# Patient Record
Sex: FEMALE | Race: WHITE | URBAN - METROPOLITAN AREA
[De-identification: names, ages, dates, MRNs, and addresses within clinical notes are randomized per-mention and may not be internally consistent; named-entity substitution may affect disease eponyms.]

---

## 2017-06-18 ENCOUNTER — OFFICE VISIT (OUTPATIENT)
Dept: URGENT CARE | Facility: URGENT CARE | Age: 72
End: 2017-06-18
Payer: MEDICARE

## 2017-06-18 VITALS
DIASTOLIC BLOOD PRESSURE: 88 MMHG | WEIGHT: 148.8 LBS | HEART RATE: 102 BPM | BODY MASS INDEX: 25.15 KG/M2 | OXYGEN SATURATION: 98 % | SYSTOLIC BLOOD PRESSURE: 130 MMHG | TEMPERATURE: 98.2 F

## 2017-06-18 DIAGNOSIS — S61.431A PUNCTURE WOUND OF HAND, RIGHT, INITIAL ENCOUNTER: ICD-10-CM

## 2017-06-18 DIAGNOSIS — S61.411A HAND LACERATION, RIGHT, INITIAL ENCOUNTER: Primary | ICD-10-CM

## 2017-06-18 PROCEDURE — 12001 RPR S/N/AX/GEN/TRNK 2.5CM/<: CPT | Performed by: FAMILY MEDICINE

## 2017-06-18 PROCEDURE — 90714 TD VACC NO PRESV 7 YRS+ IM: CPT | Performed by: FAMILY MEDICINE

## 2017-06-18 PROCEDURE — 90471 IMMUNIZATION ADMIN: CPT | Performed by: FAMILY MEDICINE

## 2017-06-18 RX ORDER — AMOXICILLIN AND CLAVULANATE POTASSIUM 500; 125 MG/1; MG/1
1 TABLET, FILM COATED ORAL 3 TIMES DAILY
Qty: 30 TABLET | Refills: 0 | Status: SHIPPED | OUTPATIENT
Start: 2017-06-18

## 2017-06-18 NOTE — NURSING NOTE
"Chief Complaint   Patient presents with     Laceration     right thunmb cut, fatigue, dizzy 40xmin        Initial /88 (BP Location: Left arm, Patient Position: Chair, Cuff Size: Adult Regular)  Pulse 102  Temp 98.2  F (36.8  C) (Oral)  Wt 148 lb 12.8 oz (67.5 kg)  LMP 10/23/2002  SpO2 98%  BMI 25.15 kg/m2 Estimated body mass index is 25.15 kg/(m^2) as calculated from the following:    Height as of 2/24/09: 5' 4.5\" (1.638 m).    Weight as of this encounter: 148 lb 12.8 oz (67.5 kg).  Medication Reconciliation: complete    "

## 2017-06-18 NOTE — MR AVS SNAPSHOT
"              After Visit Summary   2017    Ana Paula Uriostegui    MRN: [id number]           Patient Information     Date Of Birth          1945        Visit Information        Provider Department      2017 4:30 PM Cindy Gomez MD [facility]        Today's Diagnoses     Hand laceration, right, initial encounter    -  1    Puncture wound of hand, right, initial encounter           Follow-ups after your visit        Who to contact     If you have questions or need follow up information about today's clinic visit or your schedule please contact [facility] directly at [phone].  Normal or non-critical lab and imaging results will be communicated to you by [employer], letter or phone within 4 business days after the clinic has received the results. If you do not hear from us within 7 days, please contact the clinic through [employer] or phone. If you have a critical or abnormal lab result, we will notify you by phone as soon as possible.  Submit refill requests through [employer] or call your pharmacy and they will forward the refill request to us. Please allow 3 business days for your refill to be completed.          Additional Information About Your Visit        MyChart Information     [employer] lets you send messages to your doctor, view your test results, renew your prescriptions, schedule appointments and more. To sign up, go to [email or web address] . Click on \"Log in\" on the left side of the screen, which will take you to the Welcome page. Then click on \"Sign up Now\" on the right side of the page.     You will be asked to enter the access code listed below, as well as some personal information. Please follow the directions to create your username and password.     Your access code is: [email or web address]  Expires: 2017  5:42 PM     Your access code will  in 90 days. If you need help or a new code, please call your [place] clinic or " [phone].        Care EveryWhere ID     This is your Care EveryWhere ID. This could be used by other organizations to access your [facility] medical records  [id number]        Your Vitals Were     Pulse Temperature Last Period Pulse Oximetry BMI (Body Mass Index)       102 98.2  F (36.8  C) (Oral) 10/23/2002 98% 25.15 kg/m2        Blood Pressure from Last 3 Encounters:   06/18/17 130/88   07/21/15 100/62   11/08/14 106/74    Weight from Last 3 Encounters:   06/18/17 148 lb 12.8 oz (67.5 kg)   07/21/15 135 lb 6.4 oz (61.4 kg)   11/08/14 150 lb (68 kg)              We Performed the Following     CLOSURE SUPERFICIAL, WOUND DEHIS SIMPLE     TD PRESERV FREE >=7 YRS ADS IM          Today's Medication Changes          These changes are accurate as of: 6/18/17  5:42 PM.  If you have any questions, ask your nurse or doctor.               Start taking these medicines.        Dose/Directions    amoxicillin-clavulanate 500-125 MG per tablet   Commonly known as:  AUGMENTIN   Used for:  Hand laceration, right, initial encounter   Started by:  Cindy Gomez MD        Dose:  1 tablet   Take 1 tablet by mouth 3 times daily   Quantity:  30 tablet   Refills:  0            Where to get your medicines      Some of these will need a paper prescription and others can be bought over the counter.  Ask your nurse if you have questions.     Bring a paper prescription for each of these medications     amoxicillin-clavulanate 500-125 MG per tablet                Primary Care Provider Office Phone # Fax #    Red Gottlieb [phone]       [facility] [address] [facility] [address]        Thank you!     Thank you for choosing [facility]  for your care. Our goal is always to provide you with excellent care. Hearing back from our patients is one way we can continue to improve our services. Please take a few minutes to complete the written survey that you may receive in the mail  after your visit with us. Thank you!             Your Updated Medication List - Protect others around you: Learn how to safely use, store and throw away your medicines at www.disposemymeds.org.          This list is accurate as of: 6/18/17  5:42 PM.  Always use your most recent med list.                   Brand Name Dispense Instructions for use    amoxicillin-clavulanate 500-125 MG per tablet    AUGMENTIN    30 tablet    Take 1 tablet by mouth 3 times daily       CALCIUM + D CAPS 150-261-330 MG-MG-IU OR      1 tablet qd       cholecalciferol 5000 UNITS Caps    DIALYVITE VITAMIN D 5000    30 capsule    One daily       doxylamine 25 MG Tabs tablet    UNISOM     1 TABLET AT BEDTIME AS NEEDED       EPINEPHrine 0.3 MG/0.3ML injection    EPIPEN    2 each    Inject 0.3 mLs into the muscle once as needed for anaphylaxis for 1 dose.       loratadine 10 MG tablet    CLARITIN     1 TABLET DAILY       MUCINEX 600 MG 12 hr tablet   Generic drug:  guaiFENesin     28    1 TABLET EVERY 12 HOURS AS NEEDED       Multi-vitamin Tabs tablet   Generic drug:  multivitamin, therapeutic with minerals      immune enhancer       VALIUM 10 MG tablet   Generic drug:  diazepam      Take 10 mg by mouth every 6 hours as needed.       ZYRTEC 10 MG tablet   Generic drug:  cetirizine     90 Tab    1 tab PO QD PRN allergies

## 2017-06-18 NOTE — PROGRESS NOTES
Chief Complaint   Patient presents with     Laceration     right thunmb cut, fatigue, dizzy 40xmin      SUBJECTIVE:     Chief Complaint   Patient presents with     Laceration     right thunmb cut, fatigue, dizzy 40xmin      Ana Paula Uriostegui is a 71 year old female who presents to the clinic with a laceration on the right hand sustained in between the thumb and index finger 40  min ago.  This is a non-work related injury.    Mechanism of injury: knife went in when she was cutting a box open     Associated symptoms: Denies numbness, weakness, or loss of function  Last tetanus booster within 10 years: no    EXAM:   The patient appears today in alert,no apparent distress distress  VITALS: /88 (BP Location: Left arm, Patient Position: Chair, Cuff Size: Adult Regular)  Pulse 102  Temp 98.2  F (36.8  C) (Oral)  Wt 148 lb 12.8 oz (67.5 kg)  LMP 10/23/2002  SpO2 98%  BMI 25.15 kg/m2    Size of laceration: 1 centimeters  Characteristics of the laceration: active bleeding  Tendon function intact: yes  Sensation to light touch intact: yes  Pulses intact: not applicable  Picture included in patient's chart: no    Assessment:     Hand laceration, right, initial encounter  Puncture wound of hand, right, initial encounter   Ana Paula was seen today for laceration.    Diagnoses and all orders for this visit:    Hand laceration, right, initial encounter  -     TD PRESERV FREE >=7 YRS ADS IM  -     amoxicillin-clavulanate (AUGMENTIN) 500-125 MG per tablet; Take 1 tablet by mouth 3 times daily    Puncture wound of hand, right, initial encounter      Discussed with pt to look for signs of infection   If notices any redness or increasing swelling should start the antibiotic   PLAN:  PROCEDURE NOTE::  Wound was locally injected with 2 cc's of Lidocaine 1% plain  Prepped and draped in the usual sterile fashion  Laceration was closed using 4 0  4 ethilon } sutures  After care instructions:  Keep wound clean and dry for the next  24-48 hours  Sutures out in 10 days

## 2017-06-18 NOTE — NURSING NOTE
Screening Questionnaire for Adult Immunization    Are you sick today?   No   Do you have allergies to medications, food, a vaccine component or latex?  yes   Have you ever had a serious reaction after receiving a vaccination?   No   Do you have a long-term health problem with heart disease, lung disease, asthma, kidney disease, metabolic disease (e.g. diabetes), anemia, or other blood disorder?   No   Do you have cancer, leukemia, HIV/AIDS, or any other immune system problem?   No   In the past 3 months, have you taken medications that affect  your immune system, such as prednisone, other steroids, or anticancer drugs; drugs for the treatment of rheumatoid arthritis, Crohn s disease, or psoriasis; or have you had radiation treatments?   No   Have you had a seizure, or a brain or other nervous system problem?   No   During the past year, have you received a transfusion of blood or blood     products, or been given immune (gamma) globulin or antiviral drug?   No   For women: Are you pregnant or is there a chance you could become        pregnant during the next month?   No   Have you received any vaccinations in the past 4 weeks?   No     Immunization questionnaire was positive for at least one answer.  Notified Dr. Gomez.      MNVFC doesn't apply on this patient    Per orders of Dr. Gomez, injection of Td given by Cheri Allen. Patient instructed to remain in clinic for 20 minutes afterwards, and to report any adverse reaction to me immediately.       Screening performed by Cheri Allen on 6/18/2017 at 5:34 PM.

## 2017-06-24 ENCOUNTER — HEALTH MAINTENANCE LETTER (OUTPATIENT)
Age: 72
End: 2017-06-24

## 2017-06-27 ENCOUNTER — OFFICE VISIT (OUTPATIENT)
Dept: URGENT CARE | Facility: URGENT CARE | Age: 72
End: 2017-06-27
Payer: MEDICARE

## 2017-06-27 DIAGNOSIS — Z48.02 VISIT FOR SUTURE REMOVAL: Primary | ICD-10-CM

## 2017-06-27 PROCEDURE — 99024 POSTOP FOLLOW-UP VISIT: CPT

## 2017-06-27 NOTE — PROGRESS NOTES
Patient here for suture removal. Pt returned to clinic 9 days after sutures were placed in. Pt was instructed to come back in 2 days by Meg Briggs PA-C. Pt had no further questions.

## 2017-06-27 NOTE — MR AVS SNAPSHOT
"              After Visit Summary   2017    Ana Paula Uriostegui    MRN: [id number]           Patient Information     Date Of Birth          1945        Visit Information        Provider Department      2017 1:00 PM Provider, Samantha Luna MD [facility]        Today's Diagnoses     Visit for suture removal    -  1       Follow-ups after your visit        Who to contact     If you have questions or need follow up information about today's clinic visit or your schedule please contact [facility] directly at [phone].  Normal or non-critical lab and imaging results will be communicated to you by [employer], letter or phone within 4 business days after the clinic has received the results. If you do not hear from us within 7 days, please contact the clinic through [employer] or phone. If you have a critical or abnormal lab result, we will notify you by phone as soon as possible.  Submit refill requests through [employer] or call your pharmacy and they will forward the refill request to us. Please allow 3 business days for your refill to be completed.          Additional Information About Your Visit        MyChart Information     [employer] lets you send messages to your doctor, view your test results, renew your prescriptions, schedule appointments and more. To sign up, go to [email or web address] . Click on \"Log in\" on the left side of the screen, which will take you to the Welcome page. Then click on \"Sign up Now\" on the right side of the page.     You will be asked to enter the access code listed below, as well as some personal information. Please follow the directions to create your username and password.     Your access code is: [email or web address]  Expires: 2017  5:42 PM     Your access code will  in 90 days. If you need help or a new code, please call your [place] clinic or [phone].        Care EveryWhere ID     This is your Care EveryWhere " ID. This could be used by other organizations to access your [place] medical records  [id number]        Your Vitals Were     Last Period                   10/23/2002            Blood Pressure from Last 3 Encounters:   06/18/17 130/88   07/21/15 100/62   11/08/14 106/74    Weight from Last 3 Encounters:   06/18/17 148 lb 12.8 oz (67.5 kg)   07/21/15 135 lb 6.4 oz (61.4 kg)   11/08/14 150 lb (68 kg)              Today, you had the following     No orders found for display       Primary Care Provider Office Phone # Fax #    Red Gottlieb [phone]       [facility] [address] [facility] [address]        Equal Access to Services     NABEEL PRATHER : Hadii christy cote hadasho Soomaali, waaxda luqadaha, qaybta kaalmada adeegyada, waxay andi haybernadinen gina schroeder. So [facility] [phone].    ATENCIÓN: Si habla español, tiene a tan disposición servicios gratuitos de asistencia lingüística. Llame al [phone].    We comply with applicable federal civil rights laws and [place] laws. We do not discriminate on the basis of race, color, national origin, age, disability sex, sexual orientation or gender identity.            Thank you!     Thank you for choosing [facility]  for your care. Our goal is always to provide you with excellent care. Hearing back from our patients is one way we can continue to improve our services. Please take a few minutes to complete the written survey that you may receive in the mail after your visit with us. Thank you!             Your Updated Medication List - Protect others around you: Learn how to safely use, store and throw away your medicines at www.disposemymeds.org.          This list is accurate as of: 6/27/17  1:05 PM.  Always use your most recent med list.                   Brand Name Dispense Instructions for use Diagnosis    amoxicillin-clavulanate 500-125 MG per tablet    AUGMENTIN    30 tablet    Take 1 tablet by mouth 3  times daily    Hand laceration, right, initial encounter       CALCIUM + D CAPS 150-261-330 MG-MG-IU OR      1 tablet qd        cholecalciferol 5000 UNITS Caps    DIALYVITE VITAMIN D 5000    30 capsule    One daily    Vitamin D deficiency       doxylamine 25 MG Tabs tablet    UNISOM     1 TABLET AT BEDTIME AS NEEDED        EPINEPHrine 0.3 MG/0.3ML injection    EPIPEN    2 each    Inject 0.3 mLs into the muscle once as needed for anaphylaxis for 1 dose.    Allergic disorder       loratadine 10 MG tablet    CLARITIN     1 TABLET DAILY        MUCINEX 600 MG 12 hr tablet   Generic drug:  guaiFENesin     28    1 TABLET EVERY 12 HOURS AS NEEDED    Cough       Multi-vitamin Tabs tablet   Generic drug:  multivitamin, therapeutic with minerals      immune enhancer        VALIUM 10 MG tablet   Generic drug:  diazepam      Take 10 mg by mouth every 6 hours as needed.        ZYRTEC 10 MG tablet   Generic drug:  cetirizine     90 Tab    1 tab PO QD PRN allergies    Maxillary sinusitis

## 2017-06-30 ENCOUNTER — OFFICE VISIT (OUTPATIENT)
Dept: URGENT CARE | Facility: URGENT CARE | Age: 72
End: 2017-06-30
Payer: MEDICARE

## 2017-06-30 DIAGNOSIS — Z48.02 VISIT FOR SUTURE REMOVAL: Primary | ICD-10-CM

## 2017-06-30 PROCEDURE — 99024 POSTOP FOLLOW-UP VISIT: CPT

## 2017-06-30 NOTE — PROGRESS NOTES
Ana Paula Uriostegui presents to the clinic today for  removal of sutures.  The patient has had the sutures in place for 12 days.    There has been no history of infection or drainage.    O: 3 sutures are seen located on the left hand.  The wound is healing well with no signs of infection.    Tetanus status is up to date.    A: Suture removal.    P:  All sutures were easily removed today.  Routine wound care discussed.  The patient will follow up as needed.  Steri strips applied by nursing

## 2017-06-30 NOTE — MR AVS SNAPSHOT
"              After Visit Summary   2017    Ana Paula Uriostegui    MRN: [id number]           Patient Information     Date Of Birth          1945        Visit Information        Provider Department      2017 2:05 PM Provider, Samantha Luna MD [facility]        Today's Diagnoses     Visit for suture removal    -  1       Follow-ups after your visit        Who to contact     If you have questions or need follow up information about today's clinic visit or your schedule please contact [facility] directly at [phone].  Normal or non-critical lab and imaging results will be communicated to you by [employer], letter or phone within 4 business days after the clinic has received the results. If you do not hear from us within 7 days, please contact the clinic through [employer] or phone. If you have a critical or abnormal lab result, we will notify you by phone as soon as possible.  Submit refill requests through [employer] or call your pharmacy and they will forward the refill request to us. Please allow 3 business days for your refill to be completed.          Additional Information About Your Visit        MyChart Information     [employer] lets you send messages to your doctor, view your test results, renew your prescriptions, schedule appointments and more. To sign up, go to [email or web address] . Click on \"Log in\" on the left side of the screen, which will take you to the Welcome page. Then click on \"Sign up Now\" on the right side of the page.     You will be asked to enter the access code listed below, as well as some personal information. Please follow the directions to create your username and password.     Your access code is: [email or web address]  Expires: 2017  5:42 PM     Your access code will  in 90 days. If you need help or a new code, please call your [place] clinic or [phone].        Care EveryWhere ID     This is your Care EveryWhere " ID. This could be used by other organizations to access your [place] medical records  [id number]        Your Vitals Were     Last Period                   10/23/2002            Blood Pressure from Last 3 Encounters:   06/18/17 130/88   07/21/15 100/62   11/08/14 106/74    Weight from Last 3 Encounters:   06/18/17 148 lb 12.8 oz (67.5 kg)   07/21/15 135 lb 6.4 oz (61.4 kg)   11/08/14 150 lb (68 kg)              We Performed the Following     Apply Steri Strips        Primary Care Provider Office Phone # Fax #    Red Gottlieb [phone]       [facility] [address] [facility] [address]        Equal Access to Services     NABEEL PRATHER : Hadii christy cote hadasho Soomaali, waaxda luqadaha, qaybta kaalmada adeegyada, finesse rashidin ricarda damon . So [facility] [phone].    ATENCIÓN: Si habla español, tiene a tan disposición servicios gratuitos de asistencia lingüística. Llame al [phone].    We comply with applicable federal civil rights laws and [place] laws. We do not discriminate on the basis of race, color, national origin, age, disability sex, sexual orientation or gender identity.            Thank you!     Thank you for choosing [facility]  for your care. Our goal is always to provide you with excellent care. Hearing back from our patients is one way we can continue to improve our services. Please take a few minutes to complete the written survey that you may receive in the mail after your visit with us. Thank you!             Your Updated Medication List - Protect others around you: Learn how to safely use, store and throw away your medicines at www.disposemymeds.org.          This list is accurate as of: 6/30/17  2:14 PM.  Always use your most recent med list.                   Brand Name Dispense Instructions for use Diagnosis    amoxicillin-clavulanate 500-125 MG per tablet    AUGMENTIN    30 tablet    Take 1 tablet by mouth 3 times  daily    Hand laceration, right, initial encounter       CALCIUM + D CAPS 150-261-330 MG-MG-IU OR      1 tablet qd        cholecalciferol 5000 UNITS Caps    DIALYVITE VITAMIN D 5000    30 capsule    One daily    Vitamin D deficiency       doxylamine 25 MG Tabs tablet    UNISOM     1 TABLET AT BEDTIME AS NEEDED        EPINEPHrine 0.3 MG/0.3ML injection    EPIPEN    2 each    Inject 0.3 mLs into the muscle once as needed for anaphylaxis for 1 dose.    Allergic disorder       loratadine 10 MG tablet    CLARITIN     1 TABLET DAILY        MUCINEX 600 MG 12 hr tablet   Generic drug:  guaiFENesin     28    1 TABLET EVERY 12 HOURS AS NEEDED    Cough       Multi-vitamin Tabs tablet   Generic drug:  multivitamin, therapeutic with minerals      immune enhancer        VALIUM 10 MG tablet   Generic drug:  diazepam      Take 10 mg by mouth every 6 hours as needed.        ZYRTEC 10 MG tablet   Generic drug:  cetirizine     90 Tab    1 tab PO QD PRN allergies    Maxillary sinusitis

## 2018-06-30 ENCOUNTER — HEALTH MAINTENANCE LETTER (OUTPATIENT)
Age: 73
End: 2018-06-30

## 2023-06-01 ENCOUNTER — TRANSFERRED RECORDS (OUTPATIENT)
Dept: HEALTH INFORMATION MANAGEMENT | Facility: CLINIC | Age: 78
End: 2023-06-01
Payer: MEDICARE